# Patient Record
Sex: MALE | Race: WHITE | NOT HISPANIC OR LATINO | Employment: FULL TIME | URBAN - METROPOLITAN AREA
[De-identification: names, ages, dates, MRNs, and addresses within clinical notes are randomized per-mention and may not be internally consistent; named-entity substitution may affect disease eponyms.]

---

## 2017-09-15 ENCOUNTER — OFFICE VISIT (OUTPATIENT)
Dept: FAMILY MEDICINE | Facility: CLINIC | Age: 32
End: 2017-09-15

## 2017-09-15 VITALS
HEART RATE: 61 BPM | OXYGEN SATURATION: 98 % | HEIGHT: 71 IN | BODY MASS INDEX: 24.08 KG/M2 | DIASTOLIC BLOOD PRESSURE: 70 MMHG | SYSTOLIC BLOOD PRESSURE: 102 MMHG | WEIGHT: 172 LBS | TEMPERATURE: 97.9 F

## 2017-09-15 DIAGNOSIS — Z00.00 ROUTINE GENERAL MEDICAL EXAMINATION AT A HEALTH CARE FACILITY: ICD-10-CM

## 2017-09-15 DIAGNOSIS — Z23 NEEDS FLU SHOT: Primary | ICD-10-CM

## 2017-09-15 PROCEDURE — 99395 PREV VISIT EST AGE 18-39: CPT | Mod: 25 | Performed by: FAMILY MEDICINE

## 2017-09-15 PROCEDURE — 90686 IIV4 VACC NO PRSV 0.5 ML IM: CPT | Performed by: FAMILY MEDICINE

## 2017-09-15 NOTE — MR AVS SNAPSHOT
After Visit Summary   9/15/2017    Desean Haynes    MRN: [id number]           Patient Information     Date Of Birth          1985        Visit Information        Provider Department      9/15/2017 8:30 AM Ty De Jesus MD [facility]        Today's Diagnoses     Needs flu shot    -  1    Routine general medical examination at a health care facility          Care Instructions      Preventive Health Recommendations  Male Ages 26 - 39    Yearly exam:             See your health care provider every year in order to  o   Review health changes.   o   Discuss preventive care.    o   Review your medicines if your doctor has prescribed any.    You should be tested each year for STDs (sexually transmitted diseases), if you re at risk.     After age 35, talk to your provider about cholesterol testing. If you are at risk for heart disease, have your cholesterol tested at least every 5 years.     If you are at risk for diabetes, you should have a diabetes test (fasting glucose).  Shots: Get a flu shot each year. Get a tetanus shot every 10 years.     Nutrition:    Eat at least 5 servings of fruits and vegetables daily.     Eat whole-grain bread, whole-wheat pasta and brown rice instead of white grains and rice.     Talk to your provider about Calcium and Vitamin D.     Lifestyle    Exercise for at least 150 minutes a week (30 minutes a day, 5 days a week). This will help you control your weight and prevent disease.     Limit alcohol to one drink per day.     No smoking.     Wear sunscreen to prevent skin cancer.     See your dentist every six months for an exam and cleaning.             Follow-ups after your visit        Who to contact     If you have questions or need follow up information about today's clinic visit or your schedule please contact [facility] directly at [phone].  Normal or non-critical lab and imaging results will be communicated to you by MyChart, letter  "or phone within 4 business days after the clinic has received the results. If you do not hear from us within 7 days, please contact the clinic through Kadmon or phone. If you have a critical or abnormal lab result, we will notify you by phone as soon as possible.  Submit refill requests through Kadmon or call your pharmacy and they will forward the refill request to us. Please allow 3 business days for your refill to be completed.          Additional Information About Your Visit        Kadmon Information     Kadmon gives you secure access to your electronic health record. If you see a primary care provider, you can also send messages to your care team and make appointments. If you have questions, please call your primary care clinic.  If you do not have a primary care provider, please call [phone] and they will assist you.        Care EveryWhere ID     This is your Care EveryWhere ID. This could be used by other organizations to access your [facility] medical records  [id number]        Your Vitals Were     Pulse Temperature Height Pulse Oximetry BMI (Body Mass Index)       61 97.9  F (36.6  C) 1.803 m (5' 11\") 98% 23.99 kg/m2        Blood Pressure from Last 3 Encounters:   09/15/17 102/70   09/18/15 113/64   08/27/15 98/64    Weight from Last 3 Encounters:   09/15/17 78 kg (172 lb)   09/18/15 78.1 kg (172 lb 1.6 oz)   08/27/15 78 kg (172 lb)              We Performed the Following     HC FLU VAC PRESRV FREE QUAD SPLIT VIR 3+YRS IM        Primary Care Provider Office Phone # Fax #    Ty De Jesus [phone] [address]  [facility] [address]        Equal Access to Services     [facility] : Hadii jose luis gillis Socameron, waaxda luqadaha, qaybta kaalmada melony, emory kendrick . So [facility] [phone].    ATENCIÓN: Si habla español, tiene a hess disposición servicios gratuitos de asistencia lingüística. Llame al [phone].    We comply with " applicable federal civil rights laws and [place] laws. We do not discriminate on the basis of race, color, national origin, age, disability sex, sexual orientation or gender identity.            Thank you!     Thank you for choosing [facility]  for your care. Our goal is always to provide you with excellent care. Hearing back from our patients is one way we can continue to improve our services. Please take a few minutes to complete the written survey that you may receive in the mail after your visit with us. Thank you!             Your Updated Medication List - Protect others around you: Learn how to safely use, store and throw away your medicines at www.disposemymeds.org.      Notice  As of 9/15/2017 10:22 AM    You have not been prescribed any medications.

## 2017-09-15 NOTE — PROGRESS NOTES
SUBJECTIVE:   CC: Desean Haynes is an 32 year old male who presents for preventative health visit.     Healthy Habits:    Do you get at least three servings of calcium containing foods daily (dairy, green leafy vegetables, etc.)? yes    Amount of exercise or daily activities, outside of work: 1 day(s) per week    Problems taking medications regularly No    Medication side effects: No    Have you had an eye exam in the past two years? no    Do you see a dentist twice per year? yes    Do you have sleep apnea, excessive snoring or daytime drowsiness?no          NO CONCERNS    Today's PHQ-2 Score:   PHQ-2 ( 1999 Pfizer) 9/15/2017 5/4/2015   Q1: Little interest or pleasure in doing things 0 0   Q2: Feeling down, depressed or hopeless 0 0   PHQ-2 Score 0 0         Abuse: Current or Past(Physical, Sexual or Emotional)- No  Do you feel safe in your environment - Yes  Social History   Substance Use Topics     Smoking status: Former Smoker     Years: 4.00     Types: Cigarettes     Smokeless tobacco: Never Used     Alcohol use 7.0 oz/week     14 Cans of beer per week      Comment: daily beer     The patient does not drink >3 drinks per day nor >7 drinks per week.    Reviewed orders with patient. Reviewed health maintenance and updated orders accordingly - Yes  Patient Active Problem List   Diagnosis     Ankle pain, chronic     Nevus     Past Surgical History:   Procedure Laterality Date     EXCISE GANGLION WRIST Right 9/18/2015    Procedure: EXCISE GANGLION WRIST;  Surgeon: Kaur Anderson MD;  Location: [facility]     NO HISTORY OF SURGERY         Social History   Substance Use Topics     Smoking status: Former Smoker     Years: 4.00     Types: Cigarettes     Smokeless tobacco: Never Used     Alcohol use 7.0 oz/week     14 Cans of beer per week      Comment: daily beer     Family History   Problem Relation Age of Onset     CEREBROVASCULAR DISEASE Paternal Grandmother          No current outpatient prescriptions on  "file.         Reviewed and updated as needed this visit by clinical staffTobacco  Allergies  Meds         Reviewed and updated as needed this visit by Provider          ROS:  C: NEGATIVE for fever, chills, change in weight  I: NEGATIVE for worrisome rashes, moles or lesions  E: NEGATIVE for vision changes or irritation  ENT: NEGATIVE for ear, mouth and throat problems  R: NEGATIVE for significant cough or SOB  CV: NEGATIVE for chest pain, palpitations or peripheral edema  GI: NEGATIVE for nausea, abdominal pain, heartburn, or change in bowel habits   male: negative for dysuria, hematuria, decreased urinary stream, erectile dysfunction, urethral discharge  M: NEGATIVE for significant arthralgias or myalgia  N: NEGATIVE for weakness, dizziness or paresthesias  P: NEGATIVE for changes in mood or affect    OBJECTIVE:   /70  Pulse 61  Temp 97.9  F (36.6  C)  Ht 1.803 m (5' 11\")  Wt 78 kg (172 lb)  SpO2 98%  BMI 23.99 kg/m2  EXAM:  GENERAL: healthy, alert and no distress  EYES: Eyes grossly normal to inspection, PERRL and conjunctivae and sclerae normal  HENT: ear canals and TM's normal, nose and mouth without ulcers or lesions  NECK: no adenopathy, no asymmetry, masses, or scars and thyroid normal to palpation  RESP: lungs clear to auscultation - no rales, rhonchi or wheezes  CV: regular rate and rhythm, normal S1 S2, no S3 or S4, no murmur, click or rub, no peripheral edema and peripheral pulses strong  ABDOMEN: soft, nontender, no hepatosplenomegaly, no masses and bowel sounds normal  MS: no gross musculoskeletal defects noted, no edema  SKIN: no suspicious lesions or rashes  NEURO: Normal strength and tone, mentation intact and speech normal  PSYCH: mentation appears normal, affect normal/bright    ASSESSMENT/PLAN:       ICD-10-CM    1. Needs flu shot Z23 HC FLU VAC PRESRV FREE QUAD SPLIT VIR 3+YRS IM   2. Routine general medical examination at a health care facility Z00.00        COUNSELING:       " "Regular exercise       Healthy diet/nutrition       reports that he has quit smoking. His smoking use included Cigarettes. He quit after 4.00 years of use. He has never used smokeless tobacco.    Estimated body mass index is 23.99 kg/(m^2) as calculated from the following:    Height as of this encounter: 1.803 m (5' 11\").    Weight as of this encounter: 78 kg (172 lb).         Counseling Resources:  ATP IV Guidelines  Pooled Cohorts Equation Calculator  FRAX Risk Assessment  ICSI Preventive Guidelines  Dietary Guidelines for Americans, 2010  USDA's MyPlate  ASA Prophylaxis  Lung CA Screening    Ty De Jesus MD  [facility]  "

## 2018-06-12 ENCOUNTER — OFFICE VISIT (OUTPATIENT)
Dept: FAMILY MEDICINE | Facility: CLINIC | Age: 33
End: 2018-06-12

## 2018-06-12 VITALS
HEART RATE: 80 BPM | BODY MASS INDEX: 24.58 KG/M2 | RESPIRATION RATE: 12 BRPM | HEIGHT: 71 IN | WEIGHT: 175.6 LBS | DIASTOLIC BLOOD PRESSURE: 86 MMHG | SYSTOLIC BLOOD PRESSURE: 112 MMHG | TEMPERATURE: 98.1 F

## 2018-06-12 DIAGNOSIS — R10.31 GROIN PAIN, RIGHT: Primary | ICD-10-CM

## 2018-06-12 LAB
BACTERIA URINE: 0
BILIRUB UR QL STRIP: 0
BLOOD URINE DIP: ABNORMAL
CASTS/LPF: 0
COLOR UR: YELLOW
CRYSTAL URINE: 0
EPITHELIAL CELLS - QUEST: 0
GLUCOSE UR STRIP-MCNC: 0 MG/DL
KETONES UR QL STRIP: 0
LEUKOCYTE ESTERASE URINE DIP: 0
MUCOUS URINE: 0
NITRITE UR QL STRIP: ABNORMAL
PH UR STRIP: 5.5 PH (ref 5–9)
PROT UR QL: 0 MG/DL (ref ?–0.01)
RBC URINE: ABNORMAL (ref 0–3)
SP GR UR STRIP: 1.01 (ref 1–1.02)
UROBILINOGEN UR QL STRIP: 0.2 EU/DL (ref 0.2–1)
WBC URINE: 0 (ref 0–3)

## 2018-06-12 PROCEDURE — 81003 URINALYSIS AUTO W/O SCOPE: CPT | Performed by: NURSE PRACTITIONER

## 2018-06-12 PROCEDURE — 99213 OFFICE O/P EST LOW 20 MIN: CPT | Performed by: NURSE PRACTITIONER

## 2018-06-12 ASSESSMENT — PATIENT HEALTH QUESTIONNAIRE - PHQ9: 5. POOR APPETITE OR OVEREATING: NOT AT ALL

## 2018-06-12 ASSESSMENT — ANXIETY QUESTIONNAIRES
6. BECOMING EASILY ANNOYED OR IRRITABLE: SEVERAL DAYS
IF YOU CHECKED OFF ANY PROBLEMS ON THIS QUESTIONNAIRE, HOW DIFFICULT HAVE THESE PROBLEMS MADE IT FOR YOU TO DO YOUR WORK, TAKE CARE OF THINGS AT HOME, OR GET ALONG WITH OTHER PEOPLE: NOT DIFFICULT AT ALL
5. BEING SO RESTLESS THAT IT IS HARD TO SIT STILL: NOT AT ALL
1. FEELING NERVOUS, ANXIOUS, OR ON EDGE: SEVERAL DAYS
3. WORRYING TOO MUCH ABOUT DIFFERENT THINGS: NOT AT ALL
2. NOT BEING ABLE TO STOP OR CONTROL WORRYING: NOT AT ALL
7. FEELING AFRAID AS IF SOMETHING AWFUL MIGHT HAPPEN: NOT AT ALL
GAD7 TOTAL SCORE: 2

## 2018-06-12 ASSESSMENT — PAIN SCALES - GENERAL: PAINLEVEL: NO PAIN (1)

## 2018-06-12 NOTE — PROGRESS NOTES
"Problem(s) Oriented visit        SUBJECTIVE:                                                    Desean Haynes is a 33 year old male who presents to clinic today for the following health issues : right groin pain started last night, no pain radiating to back or testicles, no N/V, fever, chills.  Pulled muscle 1.5 years ago. It happened after a meeting when he help bladder so long it burned. 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         Problem list, Medication list, Allergies, and Medical/Social/Surgical histories reviewed in EPIC and updated as appropriate.   Additional history: as documented    ROS:  5 point ROS completed and negative except noted above, including Gen, CV, Resp, GI, MS    Histories:   Patient Active Problem List   Diagnosis     Ankle pain, chronic     Nevus     Past Surgical History:   Procedure Laterality Date     EXCISE GANGLION WRIST Right 9/18/2015    Procedure: EXCISE GANGLION WRIST;  Surgeon: Kaur Anderson MD;  Location: [facility]     NO HISTORY OF SURGERY         Social History   Substance Use Topics     Smoking status: Former Smoker     Years: 4.00     Types: Cigarettes     Quit date: 1/1/2006     Smokeless tobacco: Never Used     Alcohol use 4.2 - 8.4 oz/week     7 - 14 Cans of beer per week      Comment: daily beer     Family History   Problem Relation Age of Onset     CEREBROVASCULAR DISEASE Paternal Grandmother            OBJECTIVE:                                                    /86  Pulse 80  Temp 98.1  F (36.7  C) (Oral)  Resp 12  Ht 1.803 m (5' 11\")  Wt 79.7 kg (175 lb 9.6 oz)  BMI 24.49 kg/m2  Body mass index is 24.49 kg/(m^2).   APPEARANCE: = Relaxed and in no distress  Resp effort = Calm regular breathing  Breath Sounds = Good air movement with no rales or rhonchi in any lung fields  Heart Rate, Rhythm, & sounds (no Murm)  = Regular rate and rhythm with no S3, S4, or murmur appreciated.  Abdomen = Soft, nontender, no masses, & bowel sounds in all " quadrants  Abdomen: soft, nontender, no masses, & bowel sounds = tenderness mild right groin, no hernia  Liver/Spleen = Normal span and no splenomegaly noted  = testes and penis are without lesions     ASSESSMENT/PLAN:                                                        Desean was seen today for groin pain.    Diagnoses and all orders for this visit:    Groin pain, right  -     Urinalysis w/reflex protein, bili ([facility])      Results for orders placed or performed in visit on 06/12/18   Urinalysis w/reflex protein, bili ([facility])   Result Value Ref Range    Color Urine Yellow     pH Urine 5.5 5 - 9 pH    Specific Gravity Urine 1.010 1.005 - 1.025    Protein Urine 0 0.01 mg/dL    Glucose Urine 0     Ketones Urine 0     Leukocyte Esterase Urine 0     Blood Urine Trace (A)     Nitrite Urine Neg NEG    Bilirubin Urine Dip 0     Urobilinogen Urine 0.2 0.2 - 1.0 EU/dL    WBC Urine 0 0 - 3    RBC Urine Rare 0 - 3    Epithelial Cells 0     Crystal Urine 0     Bacteria Urine 0     Mucous Urine 0     Casts/LPF 0        possible pulled muscle, gently stretch, ibu as needed, correct lifting with kids.    FUTURE APPOINTMENTS:       - Follow-up for annual visit or as needed    The following health maintenance items are reviewed in Epic and correct as of today:  Health Maintenance   Topic Date Due     JASIEL QUESTIONNAIRE 1 YEAR  04/16/2001     HIV SCREEN (SYSTEM ASSIGNED)  04/16/2001     PHQ-9 Q1YR  04/16/2001     LIPID SCREEN Q5 YR MALE (SYSTEM ASSIGNED)  05/04/2020     TETANUS IMMUNIZATION (SYSTEM ASSIGNED)  02/05/2023     INFLUENZA VACCINE  Completed       KRYS Dumont CNP  [facility]  Family Practice  [facility]  [phone]    For any issues my office # is [phone]

## 2018-06-12 NOTE — MR AVS SNAPSHOT
"              After Visit Summary   6/12/2018    Desean Haynes    MRN: [id number]           Patient Information     Date Of Birth          1985        Visit Information        Provider Department      6/12/2018 1:00 PM Suzanne Sage APRN CNP [facility]        Today's Diagnoses     Groin pain, right    -  1       Follow-ups after your visit        Follow-up notes from your care team     Return if symptoms worsen or fail to improve.      Who to contact     If you have questions or need follow up information about today's clinic visit or your schedule please contact [facility] directly at [phone].  Normal or non-critical lab and imaging results will be communicated to you by Fanattachart, letter or phone within 4 business days after the clinic has received the results. If you do not hear from us within 7 days, please contact the clinic through Fanattachart or phone. If you have a critical or abnormal lab result, we will notify you by phone as soon as possible.  Submit refill requests through Rezee or call your pharmacy and they will forward the refill request to us. Please allow 3 business days for your refill to be completed.          Additional Information About Your Visit        MyChart Information     Rezee gives you secure access to your electronic health record. If you see a primary care provider, you can also send messages to your care team and make appointments. If you have questions, please call your primary care clinic.  If you do not have a primary care provider, please call [phone] and they will assist you.        Care EveryWhere ID     This is your Care EveryWhere ID. This could be used by other organizations to access your [facility] medical records  [id number]        Your Vitals Were     Pulse Temperature Respirations Height BMI (Body Mass Index)       80 98.1  F (36.7  C) (Oral) 12 1.803 m (5' 11\") 24.49 kg/m2        Blood Pressure from Last 3 Encounters: "   06/12/18 112/86   09/15/17 102/70   09/18/15 113/64    Weight from Last 3 Encounters:   06/12/18 79.7 kg (175 lb 9.6 oz)   09/15/17 78 kg (172 lb)   09/18/15 78.1 kg (172 lb 1.6 oz)              We Performed the Following     Urinalysis w/reflex protein, bili ([facility])        Primary Care Provider Office Phone # Fax #    Ty De Jesus [phone] [address] [facility] [address]        Equal Access to Services     [facility]: Hadii aad ku hadasho Soomaali, waaxda luqadaha, qaybta kaalmada adeegyada, emory jovel hayrosauran janell kendrick . So [facility] [phone].    ATENCIÓN: Si habla español, tiene a hess disposición servicios gratuitos de asistencia lingüística. Llame al [phone].    We comply with applicable federal civil rights laws and [place] laws. We do not discriminate on the basis of race, color, national origin, age, disability, sex, sexual orientation, or gender identity.            Thank you!     Thank you for choosing [facility]  for your care. Our goal is always to provide you with excellent care. Hearing back from our patients is one way we can continue to improve our services. Please take a few minutes to complete the written survey that you may receive in the mail after your visit with us. Thank you!             Your Updated Medication List - Protect others around you: Learn how to safely use, store and throw away your medicines at www.disposemymeds.org.      Notice  As of 6/12/2018  1:32 PM    You have not been prescribed any medications.

## 2018-06-13 ASSESSMENT — PATIENT HEALTH QUESTIONNAIRE - PHQ9: SUM OF ALL RESPONSES TO PHQ QUESTIONS 1-9: 2

## 2018-06-13 ASSESSMENT — ANXIETY QUESTIONNAIRES: GAD7 TOTAL SCORE: 2

## 2019-12-09 ENCOUNTER — HEALTH MAINTENANCE LETTER (OUTPATIENT)
Age: 34
End: 2019-12-09

## 2021-01-14 ENCOUNTER — HEALTH MAINTENANCE LETTER (OUTPATIENT)
Age: 36
End: 2021-01-14

## 2021-10-24 ENCOUNTER — HEALTH MAINTENANCE LETTER (OUTPATIENT)
Age: 36
End: 2021-10-24

## 2022-02-13 ENCOUNTER — HEALTH MAINTENANCE LETTER (OUTPATIENT)
Age: 37
End: 2022-02-13

## 2022-04-10 ENCOUNTER — NURSE TRIAGE (OUTPATIENT)
Dept: NURSING | Facility: CLINIC | Age: 37
End: 2022-04-10
Payer: COMMERCIAL

## 2022-04-10 ENCOUNTER — APPOINTMENT (OUTPATIENT)
Dept: CT IMAGING | Facility: CLINIC | Age: 37
End: 2022-04-10
Attending: EMERGENCY MEDICINE
Payer: COMMERCIAL

## 2022-04-10 ENCOUNTER — HOSPITAL ENCOUNTER (EMERGENCY)
Facility: CLINIC | Age: 37
Discharge: HOME | End: 2022-04-10
Attending: EMERGENCY MEDICINE | Admitting: EMERGENCY MEDICINE
Payer: COMMERCIAL

## 2022-04-10 VITALS
WEIGHT: 176 LBS | OXYGEN SATURATION: 99 % | DIASTOLIC BLOOD PRESSURE: 76 MMHG | BODY MASS INDEX: 24.55 KG/M2 | TEMPERATURE: 97.2 F | RESPIRATION RATE: 16 BRPM | HEART RATE: 89 BPM | SYSTOLIC BLOOD PRESSURE: 124 MMHG

## 2022-04-10 DIAGNOSIS — K61.0 PERIANAL ABSCESS: ICD-10-CM

## 2022-04-10 DIAGNOSIS — R19.7 DIARRHEA, UNSPECIFIED TYPE: ICD-10-CM

## 2022-04-10 LAB
ANION GAP SERPL CALCULATED.3IONS-SCNC: 3 MMOL/L (ref 3–14)
BASOPHILS # BLD AUTO: 0 10E3/UL (ref 0–0.2)
BASOPHILS NFR BLD AUTO: 0 %
BUN SERPL-MCNC: 8 MG/DL (ref 7–30)
C DIFF TOX B STL QL: NEGATIVE
CALCIUM SERPL-MCNC: 9.1 MG/DL (ref 8.5–10.1)
CHLORIDE BLD-SCNC: 106 MMOL/L (ref 94–109)
CO2 SERPL-SCNC: 28 MMOL/L (ref 20–32)
CREAT SERPL-MCNC: 0.86 MG/DL (ref 0.66–1.25)
EOSINOPHIL # BLD AUTO: 0.2 10E3/UL (ref 0–0.7)
EOSINOPHIL NFR BLD AUTO: 2 %
ERYTHROCYTE [DISTWIDTH] IN BLOOD BY AUTOMATED COUNT: 12.2 % (ref 10–15)
GFR SERPL CREATININE-BSD FRML MDRD: >90 ML/MIN/1.73M2
GLUCOSE BLD-MCNC: 108 MG/DL (ref 70–99)
HCT VFR BLD AUTO: 44.8 % (ref 40–53)
HGB BLD-MCNC: 15.3 G/DL (ref 13.3–17.7)
IMM GRANULOCYTES # BLD: 0 10E3/UL
IMM GRANULOCYTES NFR BLD: 0 %
LYMPHOCYTES # BLD AUTO: 2.1 10E3/UL (ref 0.8–5.3)
LYMPHOCYTES NFR BLD AUTO: 29 %
MCH RBC QN AUTO: 29.3 PG (ref 26.5–33)
MCHC RBC AUTO-ENTMCNC: 34.2 G/DL (ref 31.5–36.5)
MCV RBC AUTO: 86 FL (ref 78–100)
MONOCYTES # BLD AUTO: 0.8 10E3/UL (ref 0–1.3)
MONOCYTES NFR BLD AUTO: 11 %
NEUTROPHILS # BLD AUTO: 4.3 10E3/UL (ref 1.6–8.3)
NEUTROPHILS NFR BLD AUTO: 58 %
NRBC # BLD AUTO: 0 10E3/UL
NRBC BLD AUTO-RTO: 0 /100
PLATELET # BLD AUTO: 303 10E3/UL (ref 150–450)
POTASSIUM BLD-SCNC: 3.5 MMOL/L (ref 3.4–5.3)
RBC # BLD AUTO: 5.22 10E6/UL (ref 4.4–5.9)
SODIUM SERPL-SCNC: 137 MMOL/L (ref 133–144)
WBC # BLD AUTO: 7.5 10E3/UL (ref 4–11)

## 2022-04-10 PROCEDURE — 250N000011 HC RX IP 250 OP 636: Performed by: EMERGENCY MEDICINE

## 2022-04-10 PROCEDURE — 80048 BASIC METABOLIC PNL TOTAL CA: CPT | Performed by: EMERGENCY MEDICINE

## 2022-04-10 PROCEDURE — 87493 C DIFF AMPLIFIED PROBE: CPT | Mod: 91 | Performed by: EMERGENCY MEDICINE

## 2022-04-10 PROCEDURE — 46040 I&D ISCHIORCT&/PERIRCT ABSC: CPT

## 2022-04-10 PROCEDURE — 258N000003 HC RX IP 258 OP 636: Performed by: EMERGENCY MEDICINE

## 2022-04-10 PROCEDURE — 85025 COMPLETE CBC W/AUTO DIFF WBC: CPT | Performed by: EMERGENCY MEDICINE

## 2022-04-10 PROCEDURE — 99285 EMERGENCY DEPT VISIT HI MDM: CPT | Mod: 25

## 2022-04-10 PROCEDURE — 96374 THER/PROPH/DIAG INJ IV PUSH: CPT | Mod: 59

## 2022-04-10 PROCEDURE — 74177 CT ABD & PELVIS W/CONTRAST: CPT

## 2022-04-10 PROCEDURE — 36415 COLL VENOUS BLD VENIPUNCTURE: CPT | Performed by: EMERGENCY MEDICINE

## 2022-04-10 PROCEDURE — 87506 IADNA-DNA/RNA PROBE TQ 6-11: CPT | Performed by: EMERGENCY MEDICINE

## 2022-04-10 PROCEDURE — 96361 HYDRATE IV INFUSION ADD-ON: CPT

## 2022-04-10 PROCEDURE — 87070 CULTURE OTHR SPECIMN AEROBIC: CPT | Performed by: EMERGENCY MEDICINE

## 2022-04-10 RX ORDER — SODIUM CHLORIDE 9 MG/ML
INJECTION, SOLUTION INTRAVENOUS CONTINUOUS
Status: DISCONTINUED | OUTPATIENT
Start: 2022-04-10 | End: 2022-04-10 | Stop reason: HOSPADM

## 2022-04-10 RX ORDER — IOPAMIDOL 755 MG/ML
89 INJECTION, SOLUTION INTRAVASCULAR ONCE
Status: COMPLETED | OUTPATIENT
Start: 2022-04-10 | End: 2022-04-10

## 2022-04-10 RX ORDER — KETOROLAC TROMETHAMINE 15 MG/ML
15 INJECTION, SOLUTION INTRAMUSCULAR; INTRAVENOUS ONCE
Status: COMPLETED | OUTPATIENT
Start: 2022-04-10 | End: 2022-04-10

## 2022-04-10 RX ORDER — LIDOCAINE HYDROCHLORIDE AND EPINEPHRINE 10; 10 MG/ML; UG/ML
INJECTION, SOLUTION INFILTRATION; PERINEURAL
Status: DISCONTINUED
Start: 2022-04-10 | End: 2022-04-10 | Stop reason: HOSPADM

## 2022-04-10 RX ORDER — VANCOMYCIN HYDROCHLORIDE 125 MG/1
125 CAPSULE ORAL 4 TIMES DAILY
Qty: 40 CAPSULE | Refills: 0 | Status: SHIPPED | OUTPATIENT
Start: 2022-04-10 | End: 2022-04-20

## 2022-04-10 RX ADMIN — KETOROLAC TROMETHAMINE 15 MG: 15 INJECTION, SOLUTION INTRAMUSCULAR; INTRAVENOUS at 11:56

## 2022-04-10 RX ADMIN — IOPAMIDOL 89 ML: 755 INJECTION, SOLUTION INTRAVENOUS at 12:51

## 2022-04-10 RX ADMIN — SODIUM CHLORIDE 1000 ML: 9 INJECTION, SOLUTION INTRAVENOUS at 11:45

## 2022-04-10 RX ADMIN — SODIUM CHLORIDE, POTASSIUM CHLORIDE, SODIUM LACTATE AND CALCIUM CHLORIDE 1000 ML: 600; 310; 30; 20 INJECTION, SOLUTION INTRAVENOUS at 13:27

## 2022-04-10 ASSESSMENT — ENCOUNTER SYMPTOMS
ACTIVITY CHANGE: 1
CHILLS: 1
SLEEP DISTURBANCE: 1
FATIGUE: 1
ANAL BLEEDING: 1
RECTAL PAIN: 1
DIARRHEA: 1
DIAPHORESIS: 1
APPETITE CHANGE: 1
BLOOD IN STOOL: 1

## 2022-04-10 NOTE — ED TRIAGE NOTES
Presents to ED with diarrhea. Pt has an abscess, is currently on antibiotics, and states diarrhea started 4 days ago. Started taking antibiotics Tuesday. Pt states 15-18 episodes of stool in one day. Pt eating minimal amount and has minimal energy. ABCs intact. Pt A&OX4.

## 2022-04-10 NOTE — TELEPHONE ENCOUNTER
Patient saw Dr. Dewitt on Tuesday before he travelled. Patient given antibiotics for an abscess growth near his anus which helped for a couple days but since Thursday he has had diarrhea non stop. Patient has lost 5 pounds and needs to go to the bathroom every half an hour.   Patient has been having cold sweats and waking up drenched the past two mornings.   Patient reports some lightheadedness but states it is because he has not eaten yet today. Patient unsure if he has made any urine in the past 12 hours. Patient doing his best to try and stay hydrated. Patient is taking cefdinir 300 mg 1 tablet every 12 hours.   Protocol recommends ED or PCP triage.  Page to on call provider Dr. Handley who is recommending patient be seen in the ED for evaluation for sepsis, C-diff, and abscess.     Provider Recommendation Follow Up:   Reached patient/caregiver. Informed of provider's recommendations. Patient verbalized understanding and agrees with the plan. Patient reports he will go to [facility] as this is closest to his home.   Recommended that another adult drive.  Patient to call back after seen if symptoms worsen or do not improve.   Lola Buchanan RN   04/10/22 10:04 AM  [facility] Nurse Advisor  COVID 19 Nurse Triage Plan/Patient Instructions    Please be aware that novel coronavirus (COVID-19) may be circulating in the community. If you develop symptoms such as fever, cough, or SOB or if you have concerns about the presence of another infection including coronavirus (COVID-19), please contact your health care provider or visit [email or web address]     Disposition/Instructions    ED Visit recommended. Follow protocol based instructions.     Bring Your Own Device:  Please also bring your smart device(s) (smart phones, tablets, laptops) and their charging cables for your personal use and to communicate with your care team during your visit.    Thank you for taking steps to prevent the spread of this  virus.  o Limit your contact with others.  o Wear a simple mask to cover your cough.  o Wash your hands well and often.    Resources    [facility]: About COVID-19: [email or web address]    CDC: What to Do If You're Sick: www.cdc.gov/coronavirus/2019-ncov/about/steps-when-sick.html    CDC: Ending Home Isolation: www.cdc.gov/coronavirus/2019-ncov/hcp/disposition-in-home-patients.html     CDC: Caring for Someone: www.cdc.gov/coronavirus/2019-ncov/if-you-are-sick/care-for-someone.html     [facility]: Interim Guidance for Hospital Discharge to Home: www.[facility].[place]./diseases/coronavirus/hcp/hospdischarge.pdf    [facility] clinical trials (COVID-19 research studies): [email or web address].[facility]-clinical-trials     Below are the COVID-19 hotlines at the [place] Department of Health ([facility]). Interpreters are available.   o For health questions: Call 045-952-0540 or 1-139.853.2199 (7 a.m. to 7 p.m.)  o For questions about schools and childcare: Call 233-133-5160 or 1-427.660.6195 (7 a.m. to 7 p.m.)     Reason for Disposition    [1] Drinking very little AND [2] dehydration suspected (e.g., no urine > 12 hours, very dry mouth, very lightheaded)    Additional Information    Negative: Shock suspected (e.g., cold/pale/clammy skin, too weak to stand, low BP, rapid pulse)    Negative: Difficult to awaken or acting confused (e.g., disoriented, slurred speech)    Negative: Sounds like a life-threatening emergency to the triager    Negative: Vomiting also present and worse than the diarrhea    Negative: [1] Blood in stool AND [2] without diarrhea    Negative: Diarrhea in a cancer patient who is currently (or recently) receiving chemotherapy or radiation therapy, or cancer patient who has metastatic or end-stage cancer and is receiving palliative care    Negative: [1] SEVERE abdominal pain (e.g., excruciating) AND [2] present > 1 hour    Negative: [1] SEVERE abdominal pain AND [2] age > 60    Negative: [1]  Blood in the stool AND [2] moderate or large amount of blood    Negative: Black or tarry bowel movements  (Exception: chronic-unchanged  black-grey bowel movements AND is taking iron pills or Pepto-bismol)    Protocols used: DIARRHEA-A-AH

## 2022-04-11 NOTE — RESULT ENCOUNTER NOTE
Final Clostridium Difficile toxin B PCR is NEGATIVE.    No treatment or change in treatment per [facility] ED Lab Result Clostridium difficile protocol.

## 2022-04-11 NOTE — RESULT ENCOUNTER NOTE
[facility] discharge antibiotic prescribed: Vancomycin  Incision and Drainage performed in [facility] [Yes or No]: Yes  Recommendations in treatment per [facility] ED Lab Result culture protocol

## 2022-04-14 LAB — BACTERIA ABSC ANAEROBE+AEROBE CULT: NORMAL

## 2022-04-14 NOTE — RESULT ENCOUNTER NOTE
Final abscess Aerobic Bacterial Culture report is NEGATIVE.    No treatment or change in treatment per [facility] Lab Result culture protocol.

## 2022-10-15 ENCOUNTER — HEALTH MAINTENANCE LETTER (OUTPATIENT)
Age: 37
End: 2022-10-15

## 2023-03-26 ENCOUNTER — HEALTH MAINTENANCE LETTER (OUTPATIENT)
Age: 38
End: 2023-03-26

## 2024-05-26 ENCOUNTER — HEALTH MAINTENANCE LETTER (OUTPATIENT)
Age: 39
End: 2024-05-26